# Patient Record
Sex: FEMALE | Race: WHITE
[De-identification: names, ages, dates, MRNs, and addresses within clinical notes are randomized per-mention and may not be internally consistent; named-entity substitution may affect disease eponyms.]

---

## 2017-04-27 ENCOUNTER — HOSPITAL ENCOUNTER (OUTPATIENT)
Dept: HOSPITAL 53 - M SFHCPLAZ | Age: 52
End: 2017-04-27
Attending: NURSE PRACTITIONER
Payer: MEDICARE

## 2017-04-27 DIAGNOSIS — Z00.00: Primary | ICD-10-CM

## 2017-04-27 DIAGNOSIS — G62.9: ICD-10-CM

## 2017-04-27 DIAGNOSIS — F32.9: ICD-10-CM

## 2017-04-27 DIAGNOSIS — E11.9: ICD-10-CM

## 2017-04-27 LAB
ALBUMIN SERPL BCG-MCNC: 3.9 GM/DL (ref 3.2–5.2)
ALBUMIN/GLOB SERPL: 1.11 {RATIO} (ref 1–1.93)
ALP SERPL-CCNC: 166 U/L (ref 45–117)
ALT SERPL W P-5'-P-CCNC: 206 U/L (ref 12–78)
ANION GAP SERPL CALC-SCNC: 7 MEQ/L (ref 8–16)
AST SERPL-CCNC: 233 U/L (ref 15–37)
BILIRUB SERPL-MCNC: 0.6 MG/DL (ref 0.2–1)
BUN SERPL-MCNC: 9 MG/DL (ref 7–18)
CALCIUM SERPL-MCNC: 9.1 MG/DL (ref 8.5–10.1)
CHLORIDE SERPL-SCNC: 103 MEQ/L (ref 98–107)
CHOLEST SERPL-MCNC: 185 MG/DL (ref ?–200)
CO2 SERPL-SCNC: 30 MEQ/L (ref 21–32)
CREAT SERPL-MCNC: 0.75 MG/DL (ref 0.55–1.02)
EST. AVERAGE GLUCOSE BLD GHB EST-MCNC: 120 MG/DL (ref 60–110)
GFR SERPL CREATININE-BSD FRML MDRD: > 60 ML/MIN/{1.73_M2} (ref 51–?)
GLUCOSE SERPL-MCNC: 111 MG/DL (ref 70–105)
POTASSIUM SERPL-SCNC: 3.9 MEQ/L (ref 3.5–5.1)
PROT SERPL-MCNC: 7.4 GM/DL (ref 6.4–8.2)
SODIUM SERPL-SCNC: 140 MEQ/L (ref 136–145)
T4 FREE SERPL-MCNC: 1.09 NG/DL (ref 0.76–1.46)
TRIGL SERPL-MCNC: 63 MG/DL (ref ?–150)
VIT B12 SERPL-MCNC: 686 PG/ML (ref 247–911)

## 2017-05-04 ENCOUNTER — HOSPITAL ENCOUNTER (OUTPATIENT)
Dept: HOSPITAL 53 - M SFHCPLAZ | Age: 52
End: 2017-05-04
Attending: NURSE PRACTITIONER
Payer: MEDICARE

## 2017-05-04 DIAGNOSIS — F34.1: ICD-10-CM

## 2017-05-04 DIAGNOSIS — R94.5: Primary | ICD-10-CM

## 2017-05-04 DIAGNOSIS — M54.9: ICD-10-CM

## 2017-05-04 DIAGNOSIS — Z79.899: ICD-10-CM

## 2017-05-04 LAB
ALBUMIN SERPL BCG-MCNC: 4 GM/DL (ref 3.2–5.2)
ALBUMIN/GLOB SERPL: 1.08 {RATIO} (ref 1–1.93)
ALP SERPL-CCNC: 145 U/L (ref 45–117)
ALT SERPL W P-5'-P-CCNC: 54 U/L (ref 12–78)
ANION GAP SERPL CALC-SCNC: 11 MEQ/L (ref 8–16)
AST SERPL-CCNC: 27 U/L (ref 15–37)
BASOPHILS # BLD AUTO: 0 K/MM3 (ref 0–0.2)
BASOPHILS NFR BLD AUTO: 0.7 % (ref 0–1)
BILIRUB SERPL-MCNC: 0.4 MG/DL (ref 0.2–1)
BUN SERPL-MCNC: 10 MG/DL (ref 7–18)
CALCIUM SERPL-MCNC: 8.8 MG/DL (ref 8.5–10.1)
CHLORIDE SERPL-SCNC: 103 MEQ/L (ref 98–107)
CO2 SERPL-SCNC: 28 MEQ/L (ref 21–32)
CREAT SERPL-MCNC: 0.81 MG/DL (ref 0.55–1.02)
EOSINOPHIL # BLD AUTO: 0.1 K/MM3 (ref 0–0.5)
EOSINOPHIL NFR BLD AUTO: 1.7 % (ref 0–3)
ERYTHROCYTE [DISTWIDTH] IN BLOOD BY AUTOMATED COUNT: 12.3 % (ref 11.5–14.5)
FERRITIN SERPL-MCNC: 57 NG/ML (ref 8–252)
GFR SERPL CREATININE-BSD FRML MDRD: > 60 ML/MIN/{1.73_M2} (ref 51–?)
GGT SERPL-CCNC: 157 U/L (ref 5–55)
GLUCOSE SERPL-MCNC: 98 MG/DL (ref 70–105)
INR PPP: 1.05
IRON SATN MFR SERPL: 27 % (ref 13.2–37.4)
LARGE UNSTAINED CELL #: 0.1 K/MM3 (ref 0–0.4)
LARGE UNSTAINED CELL %: 1.7 % (ref 0–4)
LYMPHOCYTES # BLD AUTO: 2.6 K/MM3 (ref 1.5–4.5)
LYMPHOCYTES NFR BLD AUTO: 36.7 % (ref 24–44)
MCH RBC QN AUTO: 29.1 PG (ref 27–33)
MCHC RBC AUTO-ENTMCNC: 33.4 G/DL (ref 32–36.5)
MCV RBC AUTO: 87.1 FL (ref 80–96)
MONOCYTES # BLD AUTO: 0.5 K/MM3 (ref 0–0.8)
MONOCYTES NFR BLD AUTO: 6.8 % (ref 0–5)
NEUTROPHILS # BLD AUTO: 3.5 K/MM3 (ref 1.8–7.7)
NEUTROPHILS NFR BLD AUTO: 52.3 % (ref 36–66)
PLATELET # BLD AUTO: 277 K/MM3 (ref 150–450)
POTASSIUM SERPL-SCNC: 4.2 MEQ/L (ref 3.5–5.1)
PROT SERPL-MCNC: 7.7 GM/DL (ref 6.4–8.2)
SODIUM SERPL-SCNC: 142 MEQ/L (ref 136–145)
TIBC SERPL-MCNC: 408 UG/DL (ref 250–450)
WBC # BLD AUTO: 6.8 K/MM3 (ref 4–10)

## 2017-05-04 PROCEDURE — 86708 HEPATITIS A ANTIBODY: CPT

## 2017-05-04 PROCEDURE — 86704 HEP B CORE ANTIBODY TOTAL: CPT

## 2017-05-04 PROCEDURE — 83550 IRON BINDING TEST: CPT

## 2017-05-04 PROCEDURE — 85610 PROTHROMBIN TIME: CPT

## 2017-05-04 PROCEDURE — 86705 HEP B CORE ANTIBODY IGM: CPT

## 2017-05-04 PROCEDURE — 86038 ANTINUCLEAR ANTIBODIES: CPT

## 2017-05-04 PROCEDURE — 80053 COMPREHEN METABOLIC PANEL: CPT

## 2017-05-04 PROCEDURE — 86803 HEPATITIS C AB TEST: CPT

## 2017-05-04 PROCEDURE — 86709 HEPATITIS A IGM ANTIBODY: CPT

## 2017-05-04 PROCEDURE — 82977 ASSAY OF GGT: CPT

## 2017-05-04 PROCEDURE — 87340 HEPATITIS B SURFACE AG IA: CPT

## 2017-05-04 PROCEDURE — 85025 COMPLETE CBC W/AUTO DIFF WBC: CPT

## 2017-05-04 PROCEDURE — 82728 ASSAY OF FERRITIN: CPT

## 2017-05-08 ENCOUNTER — HOSPITAL ENCOUNTER (OUTPATIENT)
Dept: HOSPITAL 53 - M RAD | Age: 52
End: 2017-05-08
Attending: FAMILY MEDICINE
Payer: MEDICARE

## 2017-05-08 DIAGNOSIS — R94.5: Primary | ICD-10-CM

## 2017-05-08 DIAGNOSIS — K76.0: ICD-10-CM

## 2017-05-08 NOTE — REP
Clinical:  Elevated liver function tests.

 

Technique:  Real time gray scale ultrasound examination using curved array

transducer.

 

Findings:

Mild fatty infiltration to the liver cannot be excluded.  No focal hepatic

lesions are identified.  The pancreas is incompletely evaluated due to interposed

bowel gas, but visualized portions appear normal.  The patient is status post

cholecystectomy.  No biliary ductal dilatation is appreciated and the common bile

duct measures 3.7 mm diameter.  Right kidney is normal in reniform shape without

hydronephrosis and measures 11.8 x 4.5 x 4.3 cm.  No ascites.

 

Impression:

Mild fatty infiltration to the liver without focal hepatic lesions identified.

 

 

Signed by

Srinivasa Tejada MD 05/08/2017 08:55 A

## 2017-06-27 ENCOUNTER — HOSPITAL ENCOUNTER (OUTPATIENT)
Dept: HOSPITAL 53 - M SFHCPLAZ | Age: 52
End: 2017-06-27
Attending: PHYSICIAN ASSISTANT
Payer: MEDICARE

## 2017-06-27 DIAGNOSIS — E11.9: ICD-10-CM

## 2017-06-27 DIAGNOSIS — Z01.818: Primary | ICD-10-CM

## 2017-06-27 DIAGNOSIS — N20.0: ICD-10-CM

## 2017-06-27 LAB
ANION GAP SERPL CALC-SCNC: 4 MEQ/L (ref 8–16)
BASOPHILS # BLD AUTO: 0 K/MM3 (ref 0–0.2)
BASOPHILS NFR BLD AUTO: 0.7 % (ref 0–1)
BUN SERPL-MCNC: 8 MG/DL (ref 7–18)
CALCIUM SERPL-MCNC: 9 MG/DL (ref 8.5–10.1)
CHLORIDE SERPL-SCNC: 104 MEQ/L (ref 98–107)
CO2 SERPL-SCNC: 31 MEQ/L (ref 21–32)
CREAT SERPL-MCNC: 0.65 MG/DL (ref 0.55–1.02)
EOSINOPHIL # BLD AUTO: 0.2 K/MM3 (ref 0–0.5)
EOSINOPHIL NFR BLD AUTO: 1.8 % (ref 0–3)
ERYTHROCYTE [DISTWIDTH] IN BLOOD BY AUTOMATED COUNT: 12.3 % (ref 11.5–14.5)
EST. AVERAGE GLUCOSE BLD GHB EST-MCNC: 111 MG/DL (ref 60–110)
GFR SERPL CREATININE-BSD FRML MDRD: > 60 ML/MIN/{1.73_M2} (ref 51–?)
GLUCOSE SERPL-MCNC: 97 MG/DL (ref 70–105)
LARGE UNSTAINED CELL #: 0.1 K/MM3 (ref 0–0.4)
LARGE UNSTAINED CELL %: 1.5 % (ref 0–4)
LYMPHOCYTES # BLD AUTO: 3.3 K/MM3 (ref 1.5–4.5)
LYMPHOCYTES NFR BLD AUTO: 38.2 % (ref 24–44)
MCH RBC QN AUTO: 29 PG (ref 27–33)
MCHC RBC AUTO-ENTMCNC: 33.9 G/DL (ref 32–36.5)
MCV RBC AUTO: 85.7 FL (ref 80–96)
MONOCYTES # BLD AUTO: 0.5 K/MM3 (ref 0–0.8)
MONOCYTES NFR BLD AUTO: 5.5 % (ref 0–5)
NEUTROPHILS # BLD AUTO: 4.3 K/MM3 (ref 1.8–7.7)
NEUTROPHILS NFR BLD AUTO: 52.2 % (ref 36–66)
PLATELET # BLD AUTO: 271 K/MM3 (ref 150–450)
POTASSIUM SERPL-SCNC: 4.5 MEQ/L (ref 3.5–5.1)
SODIUM SERPL-SCNC: 139 MEQ/L (ref 136–145)
WBC # BLD AUTO: 8.2 K/MM3 (ref 4–10)

## 2017-06-27 PROCEDURE — 87086 URINE CULTURE/COLONY COUNT: CPT

## 2017-06-27 PROCEDURE — 85025 COMPLETE CBC W/AUTO DIFF WBC: CPT

## 2017-06-27 PROCEDURE — 83036 HEMOGLOBIN GLYCOSYLATED A1C: CPT

## 2017-06-27 PROCEDURE — 80048 BASIC METABOLIC PNL TOTAL CA: CPT

## 2017-06-27 PROCEDURE — 81001 URINALYSIS AUTO W/SCOPE: CPT

## 2017-06-27 PROCEDURE — 93005 ELECTROCARDIOGRAM TRACING: CPT

## 2017-09-27 ENCOUNTER — HOSPITAL ENCOUNTER (OUTPATIENT)
Dept: HOSPITAL 53 - M SFHCPLAZ | Age: 52
End: 2017-09-27
Attending: NURSE PRACTITIONER
Payer: MEDICARE

## 2017-09-27 DIAGNOSIS — E11.9: Primary | ICD-10-CM

## 2017-09-27 LAB
ALBUMIN SERPL BCG-MCNC: 3.9 GM/DL (ref 3.2–5.2)
ALBUMIN/GLOB SERPL: 1.08 {RATIO} (ref 1–1.93)
ALP SERPL-CCNC: 109 U/L (ref 45–117)
ALT SERPL W P-5'-P-CCNC: 28 U/L (ref 12–78)
ANION GAP SERPL CALC-SCNC: 5 MEQ/L (ref 8–16)
AST SERPL-CCNC: 21 U/L (ref 15–37)
BILIRUB SERPL-MCNC: 0.5 MG/DL (ref 0.2–1)
BUN SERPL-MCNC: 9 MG/DL (ref 7–18)
CALCIUM SERPL-MCNC: 9.3 MG/DL (ref 8.5–10.1)
CHLORIDE SERPL-SCNC: 106 MEQ/L (ref 98–107)
CO2 SERPL-SCNC: 31 MEQ/L (ref 21–32)
CREAT SERPL-MCNC: 0.7 MG/DL (ref 0.55–1.02)
EST. AVERAGE GLUCOSE BLD GHB EST-MCNC: 117 MG/DL (ref 60–110)
GFR SERPL CREATININE-BSD FRML MDRD: > 60 ML/MIN/{1.73_M2} (ref 51–?)
GLUCOSE SERPL-MCNC: 96 MG/DL (ref 70–105)
POTASSIUM SERPL-SCNC: 4.6 MEQ/L (ref 3.5–5.1)
PROT SERPL-MCNC: 7.5 GM/DL (ref 6.4–8.2)
SODIUM SERPL-SCNC: 142 MEQ/L (ref 136–145)

## 2017-09-27 PROCEDURE — 80053 COMPREHEN METABOLIC PANEL: CPT

## 2017-09-27 PROCEDURE — 83036 HEMOGLOBIN GLYCOSYLATED A1C: CPT

## 2018-03-28 ENCOUNTER — HOSPITAL ENCOUNTER (OUTPATIENT)
Dept: HOSPITAL 53 - M SFHCPLAZ | Age: 53
End: 2018-03-28
Attending: NURSE PRACTITIONER
Payer: MEDICARE

## 2018-03-28 DIAGNOSIS — E11.9: Primary | ICD-10-CM

## 2018-03-28 DIAGNOSIS — Z53.8: ICD-10-CM

## 2018-07-20 ENCOUNTER — HOSPITAL ENCOUNTER (OUTPATIENT)
Dept: HOSPITAL 53 - M LABNEURO | Age: 53
End: 2018-07-20
Attending: PSYCHIATRY & NEUROLOGY
Payer: MEDICARE

## 2018-07-20 DIAGNOSIS — E11.9: Primary | ICD-10-CM

## 2018-07-20 DIAGNOSIS — R20.2: ICD-10-CM

## 2018-07-20 DIAGNOSIS — E07.9: ICD-10-CM

## 2018-07-20 LAB
ERYTHROCYTE SEDIMENTATION RATE: 15 MM/HR (ref 0–30)
EST. AVERAGE GLUCOSE BLD GHB EST-MCNC: 120 MG/DL (ref 60–110)
FOLATE SERPL-MCNC: 14.7 NG/ML
FREE T4: 1.03 NG/DL (ref 0.76–1.46)
RHEUMATOID FACTOR QUANT: 16.2 IU/ML (ref ?–15)
THYROID STIMULATING HORMONE: 3.14 UIU/ML (ref 0.36–3.74)
TOTAL PROTEIN: 7.3 GM/DL (ref 6.4–8.2)
VIT B12 SERPL-MCNC: 692 PG/ML

## 2018-07-20 PROCEDURE — 82746 ASSAY OF FOLIC ACID SERUM: CPT

## 2018-07-24 LAB
ALBUMIN %: 57.4 % (ref 55.8–66.1)
ALBUMIN: 4.19 GM/DL (ref 3.29–5.55)
ALPHA-1-GLOBULIN %: 3.8 % (ref 2.9–4.9)
ALPHA-1-GLOBULINS: 0.28 GM/DL (ref 0.17–0.41)
ALPHA-2-GLOBULINS %: 10 % (ref 7.1–11.8)
ALPHA-2-GLOBULINS: 0.73 GM/DL (ref 0.42–0.99)
B-GLOBULIN SERPL ELPH-MCNC: 0.47 GM/DL (ref 0.28–0.6)
BETA1 GLOB MFR SERPL ELPH: 6.5 % (ref 4.7–7.2)
BETA2 GLOB MFR SERPL ELPH: 6.1 % (ref 3.2–6.5)
BETA2 GLOB SERPL ELPH-MCNC: 0.45 GM/DL (ref 0.19–0.55)
GAMMA GLOBULIN %: 16.2 % (ref 11.1–18.8)
GAMMA GLOBULINS: 1.18 GM/DL (ref 0.65–1.58)
IT SERUM INTERPRETATION: (no result)
SPEP INTERPRETATION: (no result)

## 2018-07-25 LAB
GAMMA TOCOPHEROL SERPL-MCNC: 1.1 MG/L (ref 0.5–5.5)
VITAMIN B1 LEVEL WHOLE BLOOD: 116.4 NMOL/L (ref 66.5–200)
VITAMIN B6,PYRIDOXAL PHOSPHATE: 8.6 UG/L (ref 2–32.8)
VITAMIN E(ALPHA TOCOPHEROL): 8.8 MG/L (ref 7–25.1)

## 2019-02-05 ENCOUNTER — HOSPITAL ENCOUNTER (OUTPATIENT)
Dept: HOSPITAL 53 - M SFHCPLAZ | Age: 54
End: 2019-02-05
Attending: INTERNAL MEDICINE
Payer: MEDICARE

## 2019-02-05 DIAGNOSIS — R76.8: Primary | ICD-10-CM

## 2019-02-05 PROCEDURE — 86431 RHEUMATOID FACTOR QUANT: CPT

## 2019-02-05 PROCEDURE — 36415 COLL VENOUS BLD VENIPUNCTURE: CPT

## 2019-02-05 PROCEDURE — 86200 CCP ANTIBODY: CPT

## 2019-02-05 PROCEDURE — 85652 RBC SED RATE AUTOMATED: CPT

## 2019-03-15 ENCOUNTER — HOSPITAL ENCOUNTER (OUTPATIENT)
Dept: HOSPITAL 53 - M LAB REF | Age: 54
End: 2019-03-15
Attending: PHYSICIAN ASSISTANT
Payer: MEDICARE

## 2019-03-15 DIAGNOSIS — J11.1: Primary | ICD-10-CM

## 2019-03-15 LAB
FLUAV RNA UPPER RESP QL NAA+PROBE: POSITIVE
FLUBV RNA UPPER RESP QL NAA+PROBE: NEGATIVE

## 2019-04-08 ENCOUNTER — HOSPITAL ENCOUNTER (OUTPATIENT)
Dept: HOSPITAL 53 - M SMT | Age: 54
End: 2019-04-08
Attending: NURSE PRACTITIONER
Payer: MEDICARE

## 2019-04-08 DIAGNOSIS — R31.29: Primary | ICD-10-CM

## 2019-04-08 LAB
APPEARANCE UR: CLEAR
BACTERIA UR QL AUTO: (no result)
BILIRUB UR QL STRIP.AUTO: NEGATIVE
GLUCOSE UR QL STRIP.AUTO: NEGATIVE MG/DL
HGB UR QL STRIP.AUTO: NEGATIVE
KETONES UR QL STRIP.AUTO: (no result) MG/DL
LEUKOCYTE ESTERASE UR QL STRIP.AUTO: (no result)
MUCOUS THREADS URNS QL MICRO: (no result)
NITRITE UR QL STRIP.AUTO: NEGATIVE
PH UR STRIP.AUTO: 5 UNITS (ref 5–9)
PROT UR QL STRIP.AUTO: NEGATIVE MG/DL
RBC # UR AUTO: 1 /HPF (ref 0–3)
SP GR UR STRIP.AUTO: 1.02 (ref 1–1.03)
SQUAMOUS #/AREA URNS AUTO: 1 /HPF (ref 0–6)
UROBILINOGEN UR QL STRIP.AUTO: 0.2 MG/DL (ref 0–2)
WBC #/AREA URNS AUTO: 2 /HPF (ref 0–3)

## 2019-04-08 PROCEDURE — 81001 URINALYSIS AUTO W/SCOPE: CPT

## 2019-04-08 PROCEDURE — 88108 CYTOPATH CONCENTRATE TECH: CPT

## 2019-04-08 PROCEDURE — 87086 URINE CULTURE/COLONY COUNT: CPT

## 2019-04-17 ENCOUNTER — HOSPITAL ENCOUNTER (OUTPATIENT)
Dept: HOSPITAL 53 - M RAD | Age: 54
End: 2019-04-17
Attending: NURSE PRACTITIONER
Payer: MEDICARE

## 2019-04-17 DIAGNOSIS — Z90.710: ICD-10-CM

## 2019-04-17 DIAGNOSIS — N28.1: Primary | ICD-10-CM

## 2019-04-17 DIAGNOSIS — Z87.442: ICD-10-CM

## 2019-04-17 DIAGNOSIS — Z98.84: ICD-10-CM

## 2019-04-17 DIAGNOSIS — Z90.49: ICD-10-CM

## 2019-04-17 NOTE — REP
CT of the abdomen pelvis without IV or bowel contrast for left flank pain:

Comparison is 06/23/2014.

The previous left ureteral stent has been removed.

There is a 2 mm calcification at the lower pole of the left kidney,

nonobstructive.

There are no left ureteral calculi.  There is no left hydronephrosis or

perinephric stranding.

There are no right renal calculi or right hydronephrosis.  No right ureteral

calculi.

The visualized lung fields are unremarkable.

The unenhanced hepatic parenchyma, pancreas and spleen are unremarkable.

There is bariatric surgery, unchanged.

There is a cholecystectomy, unchanged.

The abdominal aorta is unremarkable.

The bowel and mesentery are otherwise unremarkable.

Pelvis:

There is an appendectomy.  There is a hysterectomy.  The vaginal cuff and adnexa

are unremarkable.  The bladder is unremarkable.

The pelvic bowel loops are unremarkable.

There is no ascites or adenopathy.

 

Impression:

There is a 2 ml nonobstructive left renal lower pole calculus.

There are is no left ureteral calculus.  There is no left hydronephrosis or

perinephric stranding.

The previous left ureteral stent has been removed.

There are no right renal or ureteral calculi.  No right hydronephrosis.

The the patient has bariatric surgery, cholecystectomy, appendectomy and

hysterectomy.

Otherwise, essentially negative CT of the abdomen and pelvis.

 

 

Electronically Signed by

Eric Mello MD 04/17/2019 04:25 P

## 2019-06-28 ENCOUNTER — HOSPITAL ENCOUNTER (OUTPATIENT)
Dept: HOSPITAL 53 - M WHC | Age: 54
End: 2019-06-28
Attending: NURSE PRACTITIONER
Payer: MEDICARE

## 2019-06-28 DIAGNOSIS — Z80.3: ICD-10-CM

## 2019-06-28 DIAGNOSIS — Z12.31: Primary | ICD-10-CM

## 2019-06-28 NOTE — REPMRS
Patient History

The patient states she has not had a clinical breast exam in over

a year.

Patient is postmenopausal.

Family history of breast cancer in paternal grandmother, breast 

cancer in paternal aunt, breast cancer at age 35 in niece.

No Hormone Replacement Therapy

 

Digital Woman Screen Mammo: June 28, 2019 - Exam #: 

[id number]

Bilateral CC and MLO view(s) were taken.

 

Technologist: Radha Del Real, Technologist

Prior study comparison: November 1, 2017, digital woman screen 

mammo performed at [facility].  May 26, 

2016, bilateral digital mammo screening bilat, performed at 

[facility].  September 25, 2013, digital woman 

screen mammo performed at [facility].

 

FINDINGS: There are scattered fibroglandular densities.  There 

has been no change in the appearance of the mammogram from the 

prior studies.  There is a mild amount of scattered 

fibroglandular density which is fairly symmetric. There is no 

interval development of dominant mass, architectural distortion, 

or grouped microcalcification suggestive of malignancy.

3-D tomosynthesis shows no additional findings.

 

Assessment: BI-RADS/ACR category 1 mammogram. Negative Mammogram.

 

Recommendation

Routine screening mammogram of both breasts in 1 year (for women 

over age 40).

This patient's Lifetime Breast Cancer Risk is estimated at 12.4 

%.

This mammogram was interpreted with the aid of an FDA-approved 

computer-aided dectection system.

 

Electronically Signed By: Guy Gallagher MD 06/28/19 [id number]

## 2019-09-16 ENCOUNTER — HOSPITAL ENCOUNTER (OUTPATIENT)
Dept: HOSPITAL 53 - M PAIN | Age: 54
End: 2019-09-16
Attending: ANESTHESIOLOGY
Payer: MEDICARE

## 2019-09-16 DIAGNOSIS — Z90.49: ICD-10-CM

## 2019-09-16 DIAGNOSIS — M79.7: ICD-10-CM

## 2019-09-16 DIAGNOSIS — Z87.891: ICD-10-CM

## 2019-09-16 DIAGNOSIS — M79.18: ICD-10-CM

## 2019-09-16 DIAGNOSIS — R60.0: ICD-10-CM

## 2019-09-16 DIAGNOSIS — Z98.84: ICD-10-CM

## 2019-09-16 DIAGNOSIS — F32.9: ICD-10-CM

## 2019-09-16 DIAGNOSIS — Z79.899: ICD-10-CM

## 2019-09-16 DIAGNOSIS — M54.5: ICD-10-CM

## 2019-09-16 DIAGNOSIS — M47.816: ICD-10-CM

## 2019-09-16 DIAGNOSIS — Z87.442: ICD-10-CM

## 2019-09-16 DIAGNOSIS — J45.909: ICD-10-CM

## 2019-09-16 DIAGNOSIS — G89.29: Primary | ICD-10-CM

## 2019-09-16 DIAGNOSIS — M47.812: ICD-10-CM

## 2019-09-16 DIAGNOSIS — M54.2: ICD-10-CM

## 2019-09-16 DIAGNOSIS — E66.01: ICD-10-CM

## 2019-09-16 DIAGNOSIS — K21.9: ICD-10-CM

## 2019-09-16 DIAGNOSIS — I10: ICD-10-CM

## 2019-09-16 DIAGNOSIS — K58.9: ICD-10-CM

## 2019-09-16 DIAGNOSIS — E11.9: ICD-10-CM

## 2019-09-16 DIAGNOSIS — H40.9: ICD-10-CM

## 2019-09-16 DIAGNOSIS — Z79.891: ICD-10-CM

## 2019-09-16 DIAGNOSIS — F41.9: ICD-10-CM

## 2019-09-16 DIAGNOSIS — G25.81: ICD-10-CM

## 2019-09-16 DIAGNOSIS — Z88.0: ICD-10-CM

## 2019-09-27 NOTE — ECWPNPC
PATIENT NAME: DELIA CUMMINGS

: 1965

GENDER: FEMALE

MRN: [id number]

VISIT DATE: 2019

DISCHARGE DATE: 19 1427

VISIT LOCKED DATE TIME: 

PHYSICIAN: DEA VALE MD

RESOURCE: DEA VALE MD

 

           

           

REASON FOR APPOINTMENT

           

          1. CERVICALGIA

           

HISTORY OF PRESENT ILLNESS

           

      PAIN SCREENING:

      PATIENT HAS A COMPLAINT OF ACUTE OR CHRONIC PAIN

           

           

          :YES

           

           54 YEAR OLD FEMALE PATIENT WITH A HISTORY OF CHRONIC NECK, LOW

          BACK, AND FEET PAIN. THE PATIENT DESCRIBES THE PAIN AS ACHING,

          BURNING, DAILY, AND CONTINUOUS WITH A PAIN SCORE OF 2-8/10

          DEPENDING ON PHYSICAL ACTIVITY. THE PATIENT STATES SHE HAS BEEN

          SUFFERING FROM THIS PAIN FOR MANY YEARS. THE PATIENT SAYS

          CURRENTLY HER MAIN CONCERN IS HER LOW BACK PAIN, WHICH IS

          AFFECTING HER ABILITY TO PERFORM HER DAILY ACTIVITIES, SUCH AS

          COOKING, CLEANING, AND MOVING AROUND. THE PATIENT STATES HER NECK

          PAIN IS MAINLY ON HER LEFT SIDE WITH RADIATION DOWN INTO HER LEFT

          SHOULDER. PATIENT DENIES UNEXPLAINABLE WEIGHT LOSS, FEVER,

          CHILLS, NEW CHANGES ON HER URINARY OR BOWEL CONTROL.

           

      FALL RISK SCREENING:

      SCREENING

           

           

          :NO FALLS REPORTED IN THE LAST YEAR

           

CURRENT MEDICATIONS

           

          TAKING LUMIGAN 0.01 % SOLUTION 1 DROP INTO AFFECTED EYE IN THE

          EVENING OPHTHALMIC BOTH EYES AT BEDTIME(SHORT)

          TAKING BRIMONIDINE TARTRATE 0.15 % SOLUTION 1 DROP INTO AFFECTED

          EYE OPHTHALMIC THREE TIMES A DAY

          TAKING ALBUTEROL SULFATE  (90 BASE) MCG/ACT AEROSOL

          SOLUTION 2 PUFFS AS NEEDED INHALATION EVERY 4 HRS

          TAKING MELATONIN 5 MG TABLET 1 TABLET AT BEDTIME AS NEEDED WITH

          FOOD ORALLY ONCE A DAY, NOTES: OTC

          TAKING CITALOPRAM HYDROBROMIDE 20 MG TABLET 1 TABLET ORALLY ONCE

          A DAY

          TAKING BIOTIN 10 MG TABLET 1 TABLET ORALLY ONCE A DAY

          TAKING MULTIVITAMIN ADULT - TABLET AS DIRECTED ORALLY

          TAKING VITAMIN D 1000 UNIT TABLET 1 TABLET ORALLY ONCE A DAY

          TAKING CANE - MISCELLANEOUS AS DIRECTED DX BACK, LEG PAIN

          TAKING CANE - MISCELLANEOUS AS DIRECTED _ DAILY. DX: M54.5

          TAKING NORCO 5-325 MG TABLET 1 TABLET AS NEEDED ORALLY Q 12 HOURS

          PRN PAIN

          NOT-TAKING BACTRIM -160 MG TABLET 1 TABLET ORALLY AS

          DIRECTED - 1 HOUR PRIOR TO CYSTOSCOPY

          DISCONTINUED CITALOPRAM HYDROBROMIDE 20 MG TABLET 1 TABLET ORALLY

          ONCE A DAY

          MEDICATION LIST REVIEWED AND RECONCILED WITH THE PATIENT

           

PAST MEDICAL HISTORY

           

          CHRONIC LEG PAIN/FEET PAIN

          MORBID OBESITY

          DM2-CONTROLLED S/P GASTRIC BYPASS

          GLAUCOMA - DR YANEZ AT THE [facility]

          IBS

          GERD

          CHRONIC LE EDEMA

          HYPERTENSION-2010 TTE WITH BORDERLINE CONCENTRIC LVH

          WITH PRESERVED SYSTOLIC DYSFUNCTION, MINIMAL DIASTOLIC

          DYSFUNCTION-FELTON - EKG  - NON-SPECIFIC T WAVE CHANGES

          DEPRESSION/ANXIETY

          ASTHMA - HARJINDER  - FEV1 80% PREDICTED

          ALLERGIC RHINITIS

          RLS

          DDD

          FIBROMYALGIA

          ARTHRITIS

          PRIOR NICOTINE ADDICTION-1 PPD X 20 YEARS, QUIT AT AGE 35

          DUB-ABD. PAIN, HYSTERECTOMY-BSO DR. LOVE 

          NL NOC OX .

          FOCAL LOW GRADE PAPILLARY PROLIFERATION OF BLADDER WITH CYSTITIS

          GLANDULARIS, , DR VELOZ, CYSTOS EVERY 3 M X2Y, THENEVERY

          6 M X 5Y

           L ADNEXAL MASS, FELT TO BE REMAINED OVARIAN TISSUE WITH

          CYST

           NEG NUC STRESS - CANNY

          KIDNEY STONES

          11/15 C SPINE XRAY MIL TO MOD DEG CHANEGS AT C5-6, LESSER EXTENT

          AT C4-5, MINIMAL DISC SPACE NARROWING

          11/15 L SPINE XRAY WITH MOD DEG CHANGES MOST PRONOUNCED AT L4-5,

          L5-S1

          CARPAL TUNNEL- RIGHT WRIST

          ARTHRITIS- NECK AND SHOULDER

          2018-FIT TEST-NEGATIVE

           

ALLERGIES

           

          PENICILLIN (FOR ALLERGIES USE ONLY): RASH - ALLERGY

           

SURGICAL HISTORY

           

          TUBAL LIGATION 

           X 3 83, 88, 89

          CHOLECYSTECTOMY, LAP, HERNIA REPAIR, UMBILICAL 

          REPEAT UMBILICAL HERNIA REPAIR - SOURAV 

          TONSILLECTOMY, ADNOIDECTOMY 1966

          D & C X 2 --- ABLATION WITH D&C , 

          TOOTH EXTRACTION

          SYL WITH BSO SECONDARY TO MENORRHAGIA ADENOMYOSIS/ ROSALBA-SCAR

          TISSUE 2011

          TURBT 

          GASTRIC BYPASS @ [facility] 14

          APPENDECTOMY @ [facility] 14

          CYSTOSCOPY, LEFT RETROGRADE PYELOGRAM, URETEROPYELOGRAM, JJ STENT

          PLACEMENT 14

          CYSTOSCOPY, LEFT RETROGRADE PYELOGRAM, PLUS LEFT URETEROSCOPY,

          BASKET EXTRACTION OF STONE, PLUS LASER STONE LITHOTRIPSY, PLUSE

          DOUBLE J STENT EXCHANGE 14

          GASTRIC BYPASS SURGERY @ [facility] 6/6/15

          APPENDECTOMY 6/6/15

          KIDNEY STONE 5/15

          MRI CERVICAL SPINE (NEUROLOGY): MILD CERVICAL SPONDYLOSIS AT

          C5-6, NO EXTRINSIC NERVE ROOT COMPRESSION 2018

          EMG: MILD LEFT ULNAR NEUROPATHY AT THE CUBITAL TUNNEL AND MILD

          RIGHT CARPAL TUNNEL SYNDROME 2018

          CYSTOSCOPY, DR. ISRAEL: NEGATIVE FOR HIGH GRADE UROTHELIAL CARCINOMA

          2017

          CYSTOSCOPY 2019

           

FAMILY HISTORY

           

          FATHER: ALIVE 88 YRS, HYPERLIPIDEMIA.

          MOTHER: ALIVE 82 YRS, CHRONIC LEG PAIN, PE, HTN, PREDIABETIC,

          A-FIB

          SIBLINGS: BROTHER  OF HYDROCEPHALY AT 21 MO. BROTHER 2

          PSYCHOLOGICAL, SISTERS PSYCHOLOGICAL, KIDNEY STONES

          PATERNAL GRAND MOTHER: 

          PATERNAL UNCLE: , BLOOD CLOTS IN THE LEGS

          PATERNAL AUNT: ALIVE, CANCER, BREAST

          3 BROTHER(S) , 5 SISTER(S) . 2 SON(S) , 1 DAUGHTER(S) - HEALTHY.

          NO FH CAD, CVA. , NO KNOWN FAMILY HISTORY OF ANY UROLOGICALLY

          RELATED DISEASES\\\\\\\/CANCERS.

           

SOCIAL HISTORY

           

          GENERAL:

           

          TOBACCO USE

          ARE YOU A:FORMER SMOKER

          HOW LONG HAS IT BEEN SINCE YOU LAST SMOKED?> 10 YEARS

           

           

          HIV / HEP-C SCREENING

          HIV TEST OFFERED TO PATIENT:YES

          DATE OFFERED:2017

          TEST ACCEPTED:NO

          HEP-C TEST OFFERED TO PATIENT:NO

          REASON:PATIENT DECLINED

           

           

          OTHERS AT HOME: BOYFRIEND.

           

           

          EDUCATION

          LEVEL OF EDUCATION:FINISHED HIGH SCHOOL

           

           

          DIET: NO ADDED SALT.

           

           

          LANGUAGE  ENGLISH.

           

           

          DOMESTIC VIOLENCE

          DO YOU FEEL SAFE IN YOUR ENVIRONMENT?YES

           

           

          RECREATIONAL DRUG USE  DENIES.

           

           

          EXERCISE: WALKING.

           

           

          LEARNING BARRIERS / SPECIAL NEEDS

          CHANGE FROM LAST VISIT?NO

          BARRIERS TO LEARNING?NO

          HEARING IMPAIRED?NO

          VISION IMPAIRED?YES

          COGNITIVELY IMPAIRED?NO

          :CORRECTIVE LENSES

          READINESS TO LEARN?YES

          LEARNING PREFERENCES?NO

          LEARNING CAPABILITIES PRESENT?YES

          EMOTIONAL BARRIERS?NO

          SPECIAL DEVICES?NO

           NEEDED?NO

           

           

          PAIN CLINIC PFS, CLERGY, PUBLIC HEALTH REFERRALS

          HAS THE PATIENT BEEN EDUCATED REGARDING HIS/HER PLAN OF CARE?YES

          HAS THE PATIENT BEEN EDUCATED REGARDING PAIN, THE RISK FOR PAIN,

          THE IMPORTANCE OF EFFECTIVE PAIN MANAGEMENT, AND THE PAIN

          ASSESSMENT PROCESS?YES

           

           

          LATEX QUESTIONNAIRE

          LATEX ALLERGY : HAVE YOU EVER DEVELOPED ANY TYPE OF REACTION

          AFTER HANDLING LATEX PRODUCTS SUCH AS RUBBER GLOVES, CONDOMS,

          DIAPHRAGMS, BALLOONS, SOCKS, OR UNDERWEAR?NO

          LATEX ALLERGY : HAVE YOU EVER DEVELOPED ANY TYPE OF REACTION

          DURING OR AFTER DENTAL APPOINTMENT, VAGINAL/RECTAL EXAMINATION,

          SURGICAL PROCEDURE, OR ANY OTHER EXPOSURE?NO

          DATE ASKED : 2019

          LATEX RISK : HAVE YOU EVER HAD ANY DIFFICULTY BREATHING OR HIVES

          AFTER EATING OR HANDLING ANY FRUITS, OR VEGETABLES; SUCH AS KIWI,

          BANANAS, STONE FRUITS, OR CHESTNUTSNO

          LATEX RISK : DO YOU HAVE A PREVIOUS PERSONAL HISTORY OF MORE THAN

          NINE SURGERIES, SPINA BIFIDA, OR REPEATED CATHERIZATIONS? YES

          - PLEASE INDICATE : > 9 SURGERIES

          LATEX RISK : ARE YOU FREQUENTLY EXPOSED TO LATEX PRODUCTS IN YOUR

          OCCUPATION?NO

           

           

          CAFFEINE

          CAFFEINE USE?YES 1 DAY

           

           

          ADVANCE DIRECTIVE

          ADVANCE DIRECTIVE DISCUSSED WITH PATIENT:YES DECLINED

           

           

          Presybeterian  NO Yazdanism BELIEFS THAT WOULD IMPACT HEALTH CARE.

           

           

          MARITAL STATUS: ..

           

           

          ALCOHOL SCREENING

          DID YOU HAVE A DRINK CONTAINING ALCOHOL IN THE PAST YEAR?NO

          POINTS0

          INTERPRETATIONNEGATIVE

           

           

          OCCUPATION: DISABED.

           

           

          SEXUAL HX  HAD SEX IN THE LAST 12 MONTHS (VAGINAL, ORAL, OR

          ANAL)?: YES, WITH: MEN ONLY, USE PROTECTION?: NO, PREVENTION

          STRATEGIES DISCUSSED:: OTHER, HAVE YOU EVER HAD AN STD?: YES,

          CHLAMYDIA?: YES.

           

HOSPITALIZATION/MAJOR DIAGNOSTIC PROCEDURE

           

          C SECTION 83, 88,89

          D & C 88

          SYL WITH BSO 

          GASTRIC BYPASS 

          S/P GASTRIC BYPASS - CONCERN FOR BLOOD CLOTS, WORK UP NEGATIVE

          

           

REVIEW OF SYSTEMS

           

      REVIEWED BY:

           

          PROVIDER:    DEA VALE MD .

           

      CONSTITUTIONAL:

           

          ANY CHANGE IN YOUR MEDICAL CONDITION?    NO . CHILLS    NO .

          FEVER    NO .

           

      INFECTION:

           

          DO YOU HAVE NEW INFECTIONS?    YES, CHRONIC EAR INFECTIONS . DO

          YOU HAVE HISTORY OF MRSA?    NO .

           

      MUSCULOSKELETAL:

           

          ANY NEW PATTERNS OF PAIN OR NUMBNESS?    NO . SYTEMIC LUPUS    NO

          .

           

      GASTROENTEROLOGY:

           

          ANY NEW CHANGE IN BOWEL CONTROL?    NO . BARRETTS ESOPHAGUS    NO

          . CIRRHOSIS    NO . HEPATITIS    NO . LIVER FAILURE    NO . ACID

          REFLUX    YES . UNEXPLAINED WEIGHT LOSS    NO .

           

      GENITOURINARY:

           

          ANY NEW CHANGE IN BLADDER CONTROL?    YES, STRESS INCONTINENCE .

          IS THERE A CHANCE YOU COULD BE PREGNANT?    NO .

           

      HEMATOLOGY/LYMPH:

           

          DO YOU TAKE ANY BLOOD THINNERS? (FOR EXAMPLE- COUMADIN, PLAVIX,

          AGGRENOX, PLATEL, PRADAXA, OR XARELTO)    NO . WHEN WAS YOUR LAST

          DOSE?    DATE: TIME:  . LOW PLATELET COUNT    NO . SICKLE CELL

          DISEASE    NO . VON WILLIEBRANDS    NO . FACTOR V LEIDEN    NO .

          THALLASEMIA    NO . ANEMIA    NO . EASY BRUISING    NO .

           

      NEUROLOGY:

           

          HAVE YOU FALLEN IN THE PAST 12 MONTHS?    YES, FELL A FEW MONTHS

          AGO, NOT SURE WHY PT DENIES INJURIES . ANY NEW EXTREMITY NUMBNESS

          OR WEAKNESS?    NO . HEAD INJURY    NO . DEMENTIA    NO .

          CEREBRAL PALSY    NO . MULTIPLE SCLEROSIS    NO . DIZZINESS    NO

          . HEADACHE    NO . STROKES    NO . VERTIGO    NO .

           

      CARDIOLOGY:

           

          DO YOU HAVE A PACEMAKER OR DEFIBRILLATOR?    NO . ANGINA    NO .

          HEART ATTACK    NO . HEART SURGERY    NO . CONGESTIVE HEART

          FAILURE/FLUID OVERLOAD    NO . CHEST PAIN    NO . HIGH BLOOD

          PRESSURE    NO . IRREGULAR HEART BEAT    NO .

           

      RESPIRATORY:

           

          HAVE YOU BEEN SICK IN THE PAST WEEK?    NO . FEVER    NO . FLU

          LIKE SYMPTOMS?    NO . CPAP    NO . BYPAP    NO . ASTHMA    YES .

          EMPHYSEMA    NO . CHRONIC LUNG DISEASES    NO . SHORTNESS OF

          BREATH ON EXERTION    NO . COUGH    NO . SNORING    NO .

           

      INTEGUMENTARY:

           

          DO YOU HAVE ANY RASHES OR OPEN SORES?    YES, BUG BITES .

           

      ALLERGIC/IMMUNO:

           

          ARE YOU ALLERGIC TO IV DYE?    NO . ANY NEW ALLERGIES?    NO .

           

      PSYCHIATRIC:

           

          DO YOU HAVE THOUGHTS OF HURTING YOURSELF OR SOMEONE ELSE?    NO .

          ARE YOU ABUSED, NEGLECTED, OR IN AN UNSAFE ENVIRONMENT?    NO .

           

      ENDOCRINOLOGY:

           

          ARE YOU DIABETIC?    NO . THYROID DISORDER    NO .

           

      OTHER:

           

          DO YOU NEED ANY PRESCRIPTIONS?    YES, HYDROCODONE, THEY CUT MINE

          DOWN AND NOW IT'S NOT ENOUGH . IF YES, PLEASE LIST:    ____ . ANY

          NEW PROBLEMS WITH YOUR MEDICATIONS?    NO . WHEN DID YOU LAST

          EAT?    ____ . WHEN DID YOU LAST DRINK?    ____ . WHAT DID YOU

          LAST DRINK?    ____ . NAME OF PERSON DRIVING YOU HOME?    ____ .

          DO YOU HAVE ANY OTHER QUESTIONS OR CONCERNS    YES, JUST WANT

          HELP WITH PAIN .

           

VITAL SIGNS

           

          .4 LBS, HT 58 IN, BMI 46.06 INDEX, /78 MM HG, HR 73

          /MIN, RR 18 /MIN, TEMP 97.6 F, OXYGEN SAT % 96%, NA INITIALS AW

          1315, REVIEWED BY: EM.

           

EXAMINATION

           

      GENERAL EXAMINATION: PATIENT IS ALERT O X 3 AND

          COOPERATIVE. LUNGS CLEAR, TO AUSCULTATION. HEART: NO MURMURS OR

          GALLOPS; FACIAL CRANIAL NERVES ARE GROSSLY NORMAL. GOOD SYMMETRY

          OF FACIAL MUSCLE MOVEMENT. NORMAL VISUAL RUSS. PATIENT USES A

          CANE TO AMBULATE. TENDERNESS IN THE LOW BACK NEAR THE PARASPINAL

          MUSCLE GROUP. PAIN INCREASES OVER THE LUMBAR FACET JOINTS WITH

          EXTENSION AND LATERAL ROTATION OF THE LOW BACK. PRESENCE OF BANDS

          OF TISSUE AND TRIGGER POINTS WITH RESTRICTION OF MOVEMENT OF THE

          NECK, ESPECIALLY ON THE LEFT SIDE. MRI OF THE CERVICAL SPINE DONE

          ON 2018 SHOWS CERVICAL FACET ARTHROPATHY CHANGES. MRI OF

          THE LUMBAR SPINE DONE ON 2018 SHOWS LUMBAR FACET

          ARTHROPATHY CHANGES, ESPECIALLY AT L5-S1 LEVEL.

           

ASSESSMENTS

           

          CERVICALGIA - M54.2 (PRIMARY)

           

          MYALGIA, OTHER SITE - M79.18

           

          LOW BACK PAIN - M54.5

           

          OTHER CHRONIC PAIN - G89.29

           

          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

          RADICULOPATHY - M47.812

           

          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

          - M47.816

           

TREATMENT

           

      CERVICALGIA

          CLINICAL NOTES: WE DISCUSSED SEVERAL ISSUES WITH MS. CUMMINGS'S PAIN

          MANAGEMENT CASE. I DISCUSSED WITH THE PATIENT ABOUT THE OPTIONS

          OF NECK OR LOW BACK TRIGGER POINT INJECTIONS OR DIAGNOSTIC FACET

          BLOCKS TO CONSIDER RADIOFREQUENCY ABLATION DUE TO THE LOW BACK

          PAIN BEING MAINLY AXIAL AND THE LUMBAR SPINE MRI SHOWS ARTHRITIS

          IN THE FACET JOINTS, HOWEVER THE PATIENT SAYS SHE WOULD LIKE TO

          TRY MANAGING HER PAIN WITH MEDICATION FIRST. THEREFORE, I WOULD

          LIKE TO REFER THE PATIENT TO CONSIDER MEDICATION MANAGEMENT FOR

          CHRONIC PAIN. THE PATIENT WAS ADVISED TO CALL IF SHE WOULD LIKE

          TO BE SEEN FOR INJECTION THERAPY IN THE FUTURE. INSTRUCTIONS WERE

          GIVEN, QUESTIONS WERE ANSWERED, PATIENT REPORTS UNDERSTANDING AND

          AGREES WITH THE PLAN. I, JOE LACEY, DOCUMENTED THE ABOVE

          INFORMATION ACTING AS A SCRIBE FOR DR. VALE. I HAVE REVIEWED

          THE ABOVE DOCUMENT, WRITTEN BY JOE MANDEL AND I VERIFY

          THAT IT IS ACCURATE.

           

          DEAR ADIS TYLER, [facility]:

           

          THANK YOU FOR YOUR KIND REFERRAL OF DELIA CUMMINGS. IF YOU WANT TO

          DISCUSS HER CASE WITH ME PLEASE CALL ME AT THE PAIN CENTER AT

          [phone].

           

          SINCERELY,

           

          DEA VALE MD

           

          PAIN MEDICINE

           

          .

           

PROCEDURE CODES

           

           ESTABILISHED PATIENT [facility] FACILITY CHARGE

           

           CURRENT MEDS W/DOSAGES DOCUMENTED

           

           PAIN ASSESS POS TOOL F/U PLAN DOC

           

DISPOSITION & COMMUNICATION

           

FOLLOW UP

           

          REASON: BEING REFERRED TO PALLIATIVE CARE

           

 

ELECTRONICALLY SIGNED BY DEA VALE MD, MD ON

          2019 AT 05:57 PM EDT

           

           

           

 

DISCLAIMER :

THIS IS A VISIT SUMMARY EXTRACTED FROM THE [employer] CHART.

IT IS NOT A COPY OF THE [employer] PROGRESS NOTE.

MTDD

## 2019-12-27 ENCOUNTER — HOSPITAL ENCOUNTER (OUTPATIENT)
Dept: HOSPITAL 53 - M SFHCPLAZ | Age: 54
End: 2019-12-27
Attending: NURSE PRACTITIONER
Payer: MEDICARE

## 2019-12-27 DIAGNOSIS — E55.9: ICD-10-CM

## 2019-12-27 DIAGNOSIS — E11.9: Primary | ICD-10-CM

## 2019-12-27 LAB
25(OH)D3 SERPL-MCNC: 32.1 NG/ML (ref 30–100)
ALBUMIN SERPL BCG-MCNC: 3.9 GM/DL (ref 3.2–5.2)
ALT SERPL W P-5'-P-CCNC: 24 U/L (ref 12–78)
BILIRUB SERPL-MCNC: 0.4 MG/DL (ref 0.2–1)
BUN SERPL-MCNC: 12 MG/DL (ref 7–18)
CALCIUM SERPL-MCNC: 9.5 MG/DL (ref 8.5–10.1)
CHLORIDE SERPL-SCNC: 108 MEQ/L (ref 98–107)
CO2 SERPL-SCNC: 28 MEQ/L (ref 21–32)
CREAT SERPL-MCNC: 0.82 MG/DL (ref 0.55–1.3)
CREAT UR-MCNC: 230 MG/DL
EST. AVERAGE GLUCOSE BLD GHB EST-MCNC: 137 MG/DL (ref 60–110)
GFR SERPL CREATININE-BSD FRML MDRD: > 60 ML/MIN/{1.73_M2} (ref 51–?)
GLUCOSE SERPL-MCNC: 105 MG/DL (ref 70–100)
MICROALBUMIN UR-MCNC: 12.7 MG/L
MICROALBUMIN/CREAT UR: 5.5 MCG/MG (ref 0–30)
POTASSIUM SERPL-SCNC: 4.4 MEQ/L (ref 3.5–5.1)
PROT SERPL-MCNC: 7.7 GM/DL (ref 6.4–8.2)
SODIUM SERPL-SCNC: 141 MEQ/L (ref 136–145)

## 2019-12-27 PROCEDURE — 83036 HEMOGLOBIN GLYCOSYLATED A1C: CPT

## 2019-12-27 PROCEDURE — 80053 COMPREHEN METABOLIC PANEL: CPT

## 2019-12-27 PROCEDURE — 82043 UR ALBUMIN QUANTITATIVE: CPT

## 2019-12-27 PROCEDURE — 82306 VITAMIN D 25 HYDROXY: CPT

## 2019-12-27 PROCEDURE — 36415 COLL VENOUS BLD VENIPUNCTURE: CPT

## 2020-01-22 ENCOUNTER — HOSPITAL ENCOUNTER (OUTPATIENT)
Dept: HOSPITAL 53 - M LAB REF | Age: 55
End: 2020-01-22
Attending: PHYSICIAN ASSISTANT
Payer: MEDICARE

## 2020-01-22 DIAGNOSIS — J10.1: Primary | ICD-10-CM

## 2020-01-22 LAB
FLUAV RNA UPPER RESP QL NAA+PROBE: NEGATIVE
FLUBV RNA UPPER RESP QL NAA+PROBE: NEGATIVE

## 2020-12-08 ENCOUNTER — HOSPITAL ENCOUNTER (OUTPATIENT)
Dept: HOSPITAL 53 - M SFHCPLAZ | Age: 55
End: 2020-12-08
Attending: NURSE PRACTITIONER
Payer: MEDICARE

## 2020-12-08 DIAGNOSIS — Z13.220: ICD-10-CM

## 2020-12-08 DIAGNOSIS — Z13.29: ICD-10-CM

## 2020-12-08 DIAGNOSIS — Z98.84: ICD-10-CM

## 2020-12-08 DIAGNOSIS — E11.9: Primary | ICD-10-CM

## 2020-12-08 LAB
ALBUMIN SERPL BCG-MCNC: 3.9 GM/DL (ref 3.2–5.2)
ALT SERPL W P-5'-P-CCNC: 33 U/L (ref 12–78)
BILIRUB SERPL-MCNC: 0.2 MG/DL (ref 0.2–1)
BUN SERPL-MCNC: 14 MG/DL (ref 7–18)
CALCIUM SERPL-MCNC: 9.7 MG/DL (ref 8.5–10.1)
CHLORIDE SERPL-SCNC: 106 MEQ/L (ref 98–107)
CHOLEST SERPL-MCNC: 205 MG/DL (ref ?–200)
CHOLEST/HDLC SERPL: 3.8 {RATIO} (ref ?–5)
CO2 SERPL-SCNC: 31 MEQ/L (ref 21–32)
CREAT SERPL-MCNC: 0.89 MG/DL (ref 0.55–1.3)
CREAT UR-MCNC: 285 MG/DL
EST. AVERAGE GLUCOSE BLD GHB EST-MCNC: 123 MG/DL (ref 60–110)
FERRITIN SERPL-MCNC: 29 NG/ML (ref 8–252)
GFR SERPL CREATININE-BSD FRML MDRD: > 60 ML/MIN/{1.73_M2} (ref 51–?)
GLUCOSE SERPL-MCNC: 103 MG/DL (ref 70–100)
HCT VFR BLD AUTO: 44.7 % (ref 36–47)
HDLC SERPL-MCNC: 54 MG/DL (ref 40–?)
HGB BLD-MCNC: 14.1 G/DL (ref 12–15.5)
IRON SATN MFR SERPL: 20.9 % (ref 13.2–45)
IRON SERPL-MCNC: 82 UG/DL (ref 50–170)
LDLC SERPL CALC-MCNC: 132 MG/DL (ref ?–100)
MCH RBC QN AUTO: 27.4 PG (ref 27–33)
MCHC RBC AUTO-ENTMCNC: 31.5 G/DL (ref 32–36.5)
MCV RBC AUTO: 86.8 FL (ref 80–96)
MICROALBUMIN UR-MCNC: 25.7 MG/L
MICROALBUMIN/CREAT UR: 9 MCG/MG (ref 0–30)
NONHDLC SERPL-MCNC: 151 MG/DL
PLATELET # BLD AUTO: 320 10^3/UL (ref 150–450)
POTASSIUM SERPL-SCNC: 5.2 MEQ/L (ref 3.5–5.1)
PROT SERPL-MCNC: 8 GM/DL (ref 6.4–8.2)
RBC # BLD AUTO: 5.15 10^6/UL (ref 4–5.4)
SODIUM SERPL-SCNC: 139 MEQ/L (ref 136–145)
TIBC SERPL-MCNC: 393 UG/DL (ref 250–450)
TRIGL SERPL-MCNC: 93 MG/DL (ref ?–150)
TSH SERPL DL<=0.005 MIU/L-ACNC: 2.88 UIU/ML (ref 0.36–3.74)
WBC # BLD AUTO: 9.1 10^3/UL (ref 4–10)

## 2020-12-08 PROCEDURE — 83550 IRON BINDING TEST: CPT

## 2020-12-08 PROCEDURE — 84443 ASSAY THYROID STIM HORMONE: CPT

## 2020-12-08 PROCEDURE — 80053 COMPREHEN METABOLIC PANEL: CPT

## 2020-12-08 PROCEDURE — 82728 ASSAY OF FERRITIN: CPT

## 2020-12-08 PROCEDURE — 36415 COLL VENOUS BLD VENIPUNCTURE: CPT

## 2020-12-08 PROCEDURE — 82043 UR ALBUMIN QUANTITATIVE: CPT

## 2020-12-08 PROCEDURE — 80061 LIPID PANEL: CPT

## 2020-12-08 PROCEDURE — 83036 HEMOGLOBIN GLYCOSYLATED A1C: CPT

## 2020-12-08 PROCEDURE — 85027 COMPLETE CBC AUTOMATED: CPT

## 2020-12-14 ENCOUNTER — HOSPITAL ENCOUNTER (OUTPATIENT)
Dept: HOSPITAL 53 - M WHC | Age: 55
End: 2020-12-14
Attending: NURSE PRACTITIONER
Payer: MEDICARE

## 2020-12-14 DIAGNOSIS — Z80.3: ICD-10-CM

## 2020-12-14 DIAGNOSIS — Z12.31: Primary | ICD-10-CM

## 2020-12-14 DIAGNOSIS — Z78.0: ICD-10-CM

## 2020-12-14 DIAGNOSIS — Z85.89: ICD-10-CM

## 2020-12-14 NOTE — REPMRS
Patient History

The patient states she had a clinical breast exam in December 2020.

Patient is postmenopausal and has history of other cancer at age 

46.

Family history of breast cancer in paternal grandmother, breast 

cancer in paternal aunt, breast cancer at age 35 in niece, breast

cancer at age 82 in mother.

No Hormone Replacement Therapy

 

Digital Woman Screen Mammo: December 14, 2020 - Exam #: 

[id number]

Bilateral CC and MLO view(s) were taken.

 

Technologist: Milvia Blanton Technologist

Prior study comparison: June 28, 2019, bilateral digital woman 

screen mammo performed at [facility] 

[facility].  November 1, 2017, digital woman screen mammo 

performed at [facility].  

May 26, 2016, bilateral digital mammo screening bilat, performed 

at [facility].

 

FINDINGS: There are scattered fibroglandular densities.  The 

Volpara volumetric breast density category is:B.  There has been 

no change in the appearance of the mammogram from the prior 

studies.  There is a mild amount of scattered fibroglandular 

density which is fairly symmetric. There is no interval 

development of dominant mass, architectural distortion, or 

grouped microcalcification suggestive of malignancy.

3-D tomosynthesis shows no additional findings.

 

Assessment: BI-RADS/ACR category 1 mammogram. Negative Mammogram.

 

Recommendation

Routine screening mammogram of both breasts in 1 year (for women 

over age 40).

This patient's  [facility] Lifetime Breast Cancer Risk is 

estimated at 16.4 %.

This mammogram was interpreted with the aid of an FDA-approved 

computer-aided dectection system.

 

Electronically Signed By: Guy Gallagher MD 12/14/20 [id number]

## 2021-04-12 ENCOUNTER — HOSPITAL ENCOUNTER (OUTPATIENT)
Dept: HOSPITAL 53 - M WUC | Age: 56
End: 2021-04-12
Attending: PHYSICIAN ASSISTANT
Payer: MEDICARE
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
## 2021-04-12 DIAGNOSIS — S62.346A: Primary | ICD-10-CM
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
## 2021-04-12 NOTE — REP
INDICATION:

PAIN.



COMPARISON:

None



TECHNIQUE:

Four views



FINDINGS:

There is a fracture involving the base of the 5th metacarpal with slight medial

displacement and angulation.



IMPRESSION:

Fracture as described above.





<Electronically signed by Ashok Sawyer > 04/12/21 [id number]

## 2021-06-07 ENCOUNTER — HOSPITAL ENCOUNTER (OUTPATIENT)
Dept: HOSPITAL 53 - M RAD | Age: 56
End: 2021-06-07
Attending: PHYSICIAN ASSISTANT
Payer: MEDICARE

## 2021-06-07 ENCOUNTER — HOSPITAL ENCOUNTER (EMERGENCY)
Dept: HOSPITAL 53 - M ED | Age: 56
Discharge: HOME | End: 2021-06-07
Payer: MEDICARE

## 2021-06-07 VITALS — SYSTOLIC BLOOD PRESSURE: 167 MMHG | DIASTOLIC BLOOD PRESSURE: 86 MMHG

## 2021-06-07 VITALS — WEIGHT: 214.95 LBS | HEIGHT: 58 IN | BODY MASS INDEX: 45.12 KG/M2

## 2021-06-07 DIAGNOSIS — S62.316D: Primary | ICD-10-CM

## 2021-06-07 DIAGNOSIS — F41.9: ICD-10-CM

## 2021-06-07 DIAGNOSIS — E11.9: ICD-10-CM

## 2021-06-07 DIAGNOSIS — I10: ICD-10-CM

## 2021-06-07 DIAGNOSIS — M54.9: ICD-10-CM

## 2021-06-07 DIAGNOSIS — K58.8: ICD-10-CM
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
## 2021-06-07 DIAGNOSIS — F07.81: ICD-10-CM

## 2021-06-07 DIAGNOSIS — J45.909: ICD-10-CM

## 2021-06-07 DIAGNOSIS — R10.32: ICD-10-CM

## 2021-06-07 DIAGNOSIS — F32.9: ICD-10-CM
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
## 2021-06-07 DIAGNOSIS — S62.316D: ICD-10-CM

## 2021-06-07 DIAGNOSIS — K59.00: ICD-10-CM

## 2021-06-07 DIAGNOSIS — Z88.0: ICD-10-CM

## 2021-06-07 DIAGNOSIS — S09.90XA: Primary | ICD-10-CM

## 2021-06-07 DIAGNOSIS — Z79.899: ICD-10-CM
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
## 2021-06-07 DIAGNOSIS — M47.812: ICD-10-CM

## 2021-06-07 DIAGNOSIS — K21.9: ICD-10-CM

## 2021-06-07 LAB
ALBUMIN SERPL BCG-MCNC: 3.9 GM/DL (ref 3.2–5.2)
ALT SERPL W P-5'-P-CCNC: 32 U/L (ref 12–78)
BASOPHILS # BLD AUTO: 0.1 10^3/UL (ref 0–0.2)
BASOPHILS NFR BLD AUTO: 0.6 % (ref 0–1)
BILIRUB CONJ SERPL-MCNC: 0.2 MG/DL (ref 0–0.2)
BILIRUB SERPL-MCNC: 0.5 MG/DL (ref 0.2–1)
BUN SERPL-MCNC: 10 MG/DL (ref 7–18)
CALCIUM SERPL-MCNC: 9.6 MG/DL (ref 8.5–10.1)
CHLORIDE SERPL-SCNC: 105 MEQ/L (ref 98–107)
CO2 SERPL-SCNC: 28 MEQ/L (ref 21–32)
CREAT SERPL-MCNC: 0.8 MG/DL (ref 0.55–1.3)
EOSINOPHIL # BLD AUTO: 0.2 10^3/UL (ref 0–0.5)
EOSINOPHIL NFR BLD AUTO: 1.8 % (ref 0–3)
GFR SERPL CREATININE-BSD FRML MDRD: > 60 ML/MIN/{1.73_M2} (ref 51–?)
GLUCOSE SERPL-MCNC: 107 MG/DL (ref 70–100)
HCT VFR BLD AUTO: 45 % (ref 36–47)
HGB BLD-MCNC: 14.7 G/DL (ref 12–15.5)
LYMPHOCYTES # BLD AUTO: 2.9 10^3/UL (ref 1.5–5)
LYMPHOCYTES NFR BLD AUTO: 30.2 % (ref 24–44)
MCH RBC QN AUTO: 28.3 PG (ref 27–33)
MCHC RBC AUTO-ENTMCNC: 32.7 G/DL (ref 32–36.5)
MCV RBC AUTO: 86.7 FL (ref 80–96)
MONOCYTES # BLD AUTO: 0.8 10^3/UL (ref 0–0.8)
MONOCYTES NFR BLD AUTO: 8.5 % (ref 2–8)
NEUTROPHILS # BLD AUTO: 5.7 10^3/UL (ref 1.5–8.5)
NEUTROPHILS NFR BLD AUTO: 58.5 % (ref 36–66)
PLATELET # BLD AUTO: 316 10^3/UL (ref 150–450)
POTASSIUM SERPL-SCNC: 3.8 MEQ/L (ref 3.5–5.1)
PROT SERPL-MCNC: 8.2 GM/DL (ref 6.4–8.2)
RBC # BLD AUTO: 5.19 10^6/UL (ref 4–5.4)
SODIUM SERPL-SCNC: 141 MEQ/L (ref 136–145)
WBC # BLD AUTO: 9.7 10^3/UL (ref 4–10)

## 2021-06-07 NOTE — REP
INDICATION:

LLQ PAIN, EVAL FOR DIVERTICULITIS.



COMPARISON:

04/17/2019 the latest prior also without contrast



TECHNIQUE:

Standard helical technique without intravenous contrast or oral bowel preparatory

contrast administration



FINDINGS:

The lung bases are clear and unchanged.



The liver, spleen, pancreas, adrenal glands, and kidneys are within normal limits.

The small calculus seen previously in the inferior pole the left kidney is no longer

present.  There is no nephroureterolithiasis, hydronephrosis, or hydroureter.  There

are no urinary bladder calcifications.  Limited evaluation of the bowel loops and the

mesenteries show no gross abnormalities.  There is no free fluid or free air.  Limited

evaluation of the abdominal aorta and para-aortic regions show no gross abnormalities

or significant changes.  There is no evidence of a mass or adenopathy.  There is no

change in the osseous structures.



IMPRESSION:

There is no acute intraabdominal or intrapelvic disease.  Findings as described above.





<Electronically signed by Ashok Sawyer > 06/07/21 0512

## 2021-06-07 NOTE — REPVR
PROCEDURE INFORMATION: 

Exam: CT Head Without Contrast 

Exam date and time: 6/7/2021 3:08 PM 

Age: 55 years old 

Clinical indication: Injury or trauma; Fall; Blunt trauma (contusions or 

hematomas); Additional info: Head injury, dizziness 



TECHNIQUE: 

Imaging protocol: Computed tomography of the head without contrast. 

Radiation optimization: All CT scans at this facility use at least one of these 

dose optimization techniques: automated exposure control; mA and/or kV 

adjustment per patient size (includes targeted exams where dose is matched to 

clinical indication); or iterative reconstruction. 



COMPARISON: 

No relevant prior studies available. 



FINDINGS: 

Brain: Normal. No hemorrhage. Unremarkable white matter. No mass effect. 

Cerebral ventricles: No ventriculomegaly. 

Paranasal sinuses: Visualized sinuses are unremarkable. No fluid levels. 

Mastoid air cells: Visualized mastoid air cells are well aerated. 

Bones/joints: Unremarkable. No acute fracture. 

Soft tissues: Unremarkable. 



IMPRESSION: 

No CT evidence for acute intracranial abnormality. 



Electronically signed by: Aníbal Castle On 06/07/2021  15:38:51 PM

## 2021-06-07 NOTE — REP
INDICATION:

DISP FX OF BASE OF 5TH MC BONE, R HAND, 7THB.



COMPARISON:

No prior CTs



TECHNIQUE:

Helical technique using 2 mm increments and reconstructed in both sagittal and coronal

planes.



FINDINGS:

The previously identified fracture involving the proximal aspect of the 5th metacarpal

is seen with hazy margins and increased density surrounding the fracture fragments

consistent with callus formation from healing.  There are no additional fractures.



IMPRESSION:

Healing fracture of the base of the 5th metacarpal.





<Electronically signed by Ashok Sawyer > 06/07/21 [id number]

## 2021-06-07 NOTE — REPVR
PROCEDURE INFORMATION: 

Exam: CT Cervical Spine Without Contrast 

Exam date and time: 6/7/2021 3:08 PM 

Age: 55 years old 

Clinical indication: Injury or trauma; Fall; Blunt trauma; Additional info: 

Head injury, dizziness 



TECHNIQUE: 

Imaging protocol: Computed tomography images of the cervical spine without 

contrast. 

Radiation optimization: All CT scans at this facility use at least one of these 

dose optimization techniques: automated exposure control; mA and/or kV 

adjustment per patient size (includes targeted exams where dose is matched to 

clinical indication); or iterative reconstruction. 



COMPARISON: 

CR SPINE CERVICAL COMPLETE 11/25/2015 10:27 AM 



FINDINGS: 

Bones/joints: Vertebral body heights are intact. Mild reversal of the normal 

cervical lordotic curve could be secondary to muscle spasm or positioning. 

Alignment is otherwise maintained. There is mild productive change about the 

dens. No acute fracture is identified. 

Discs/Spinal canal/Neural foramina: There is multilevel spondylosis with 

variable osteophytic encroachment of several neural foramina. CT is not optimal 

for the evaluation of the discs, neural foramina or spinal canal or cord. There 

may be spinal stenosis at C5-C6. 



Lungs: The visualized lung apices are clear. 

Pleural spaces: No apical pneumothorax is identified. 

Soft tissues: The prevertebral soft tissues are not significantly swollen. 



IMPRESSION: 

1. No acute fracture or dislocation identified. 

2. Mild reversal of the normal cervical lordotic curve, could be secondary to 

muscle spasm or positioning. 

3. Spondylosis with potential spinal stenosis at C5-C6. 



Electronically signed by: Aníbal Castle On 06/07/2021  15:42:55 PM

## 2021-09-15 ENCOUNTER — HOSPITAL ENCOUNTER (OUTPATIENT)
Dept: HOSPITAL 53 - M PLALAB | Age: 56
End: 2021-09-15
Attending: NURSE PRACTITIONER
Payer: MEDICARE

## 2021-09-15 DIAGNOSIS — E11.9: ICD-10-CM

## 2021-09-15 DIAGNOSIS — Z79.899: ICD-10-CM

## 2021-09-15 DIAGNOSIS — Z13.29: Primary | ICD-10-CM

## 2021-09-15 DIAGNOSIS — E55.9: ICD-10-CM

## 2021-09-15 DIAGNOSIS — Z98.84: ICD-10-CM

## 2021-09-15 LAB
25(OH)D3 SERPL-MCNC: 24.7 NG/ML (ref 30–100)
ALBUMIN SERPL BCG-MCNC: 4 GM/DL (ref 3.2–5.2)
ALT SERPL W P-5'-P-CCNC: 30 U/L (ref 12–78)
BILIRUB SERPL-MCNC: 0.4 MG/DL (ref 0.2–1)
BUN SERPL-MCNC: 12 MG/DL (ref 7–18)
CALCIUM SERPL-MCNC: 9.4 MG/DL (ref 8.5–10.1)
CHLORIDE SERPL-SCNC: 105 MEQ/L (ref 98–107)
CO2 SERPL-SCNC: 28 MEQ/L (ref 21–32)
CREAT SERPL-MCNC: 0.86 MG/DL (ref 0.55–1.3)
CREAT UR-MCNC: 220 MG/DL
EST. AVERAGE GLUCOSE BLD GHB EST-MCNC: 120 MG/DL (ref 60–110)
GFR SERPL CREATININE-BSD FRML MDRD: > 60 ML/MIN/{1.73_M2} (ref 51–?)
GLUCOSE SERPL-MCNC: 95 MG/DL (ref 70–100)
HCT VFR BLD AUTO: 43.9 % (ref 36–47)
HGB BLD-MCNC: 14.2 G/DL (ref 12–15.5)
IRON SERPL-MCNC: 71 UG/DL (ref 50–170)
MCH RBC QN AUTO: 28.2 PG (ref 27–33)
MCHC RBC AUTO-ENTMCNC: 32.3 G/DL (ref 32–36.5)
MCV RBC AUTO: 87.1 FL (ref 80–96)
MICROALBUMIN UR-MCNC: 23.4 MG/L
MICROALBUMIN/CREAT UR: 10.6 MCG/MG (ref 0–30)
PLATELET # BLD AUTO: 346 10^3/UL (ref 150–450)
POTASSIUM SERPL-SCNC: 4.9 MEQ/L (ref 3.5–5.1)
PROT SERPL-MCNC: 7.9 GM/DL (ref 6.4–8.2)
RBC # BLD AUTO: 5.04 10^6/UL (ref 4–5.4)
SODIUM SERPL-SCNC: 139 MEQ/L (ref 136–145)
TSH SERPL DL<=0.005 MIU/L-ACNC: 2.7 UIU/ML (ref 0.36–3.74)
WBC # BLD AUTO: 11.1 10^3/UL (ref 4–10)

## 2021-09-15 PROCEDURE — 85027 COMPLETE CBC AUTOMATED: CPT

## 2021-09-15 PROCEDURE — 84443 ASSAY THYROID STIM HORMONE: CPT

## 2021-09-15 PROCEDURE — 36415 COLL VENOUS BLD VENIPUNCTURE: CPT

## 2021-09-15 PROCEDURE — 80053 COMPREHEN METABOLIC PANEL: CPT

## 2021-09-15 PROCEDURE — 82306 VITAMIN D 25 HYDROXY: CPT

## 2021-09-15 PROCEDURE — 83540 ASSAY OF IRON: CPT

## 2021-09-15 PROCEDURE — 83036 HEMOGLOBIN GLYCOSYLATED A1C: CPT

## 2021-09-15 PROCEDURE — 82043 UR ALBUMIN QUANTITATIVE: CPT

## 2022-05-31 ENCOUNTER — HOSPITAL ENCOUNTER (OUTPATIENT)
Dept: HOSPITAL 53 - M PLALAB | Age: 57
End: 2022-05-31
Attending: NURSE PRACTITIONER
Payer: MEDICARE

## 2022-05-31 DIAGNOSIS — E11.9: ICD-10-CM

## 2022-05-31 DIAGNOSIS — R07.9: Primary | ICD-10-CM

## 2022-05-31 DIAGNOSIS — M79.7: ICD-10-CM

## 2022-05-31 DIAGNOSIS — K58.2: ICD-10-CM

## 2022-05-31 DIAGNOSIS — Z79.899: ICD-10-CM

## 2022-05-31 DIAGNOSIS — Z13.220: ICD-10-CM

## 2022-05-31 DIAGNOSIS — E55.9: ICD-10-CM

## 2022-05-31 DIAGNOSIS — Z13.29: ICD-10-CM

## 2022-05-31 LAB
25(OH)D3 SERPL-MCNC: 26.9 NG/ML (ref 30–100)
ALBUMIN SERPL BCG-MCNC: 3.8 GM/DL (ref 3.2–5.2)
ALT SERPL W P-5'-P-CCNC: 24 U/L (ref 12–78)
BILIRUB SERPL-MCNC: 0.4 MG/DL (ref 0.2–1)
BUN SERPL-MCNC: 14 MG/DL (ref 7–18)
CALCIUM SERPL-MCNC: 9.9 MG/DL (ref 8.5–10.1)
CHLORIDE SERPL-SCNC: 107 MEQ/L (ref 98–107)
CHOLEST SERPL-MCNC: 208 MG/DL (ref ?–200)
CHOLEST/HDLC SERPL: 4 {RATIO} (ref ?–5)
CK MB CFR.DF SERPL CALC: 1.69
CK MB SERPL-MCNC: < 1 NG/ML (ref ?–3.6)
CK SERPL-CCNC: 59 U/L (ref 26–192)
CO2 SERPL-SCNC: 29 MEQ/L (ref 21–32)
CREAT SERPL-MCNC: 0.78 MG/DL (ref 0.55–1.3)
EST. AVERAGE GLUCOSE BLD GHB EST-MCNC: 123 MG/DL (ref 60–110)
FERRITIN SERPL-MCNC: 23 NG/ML (ref 8–252)
GFR SERPL CREATININE-BSD FRML MDRD: > 60 ML/MIN/{1.73_M2} (ref 51–?)
GLUCOSE SERPL-MCNC: 108 MG/DL (ref 70–100)
HDLC SERPL-MCNC: 52 MG/DL (ref 40–?)
LDLC SERPL CALC-MCNC: 138 MG/DL (ref ?–100)
NONHDLC SERPL-MCNC: 156 MG/DL
POTASSIUM SERPL-SCNC: 4.2 MEQ/L (ref 3.5–5.1)
PROT SERPL-MCNC: 7.6 GM/DL (ref 6.4–8.2)
SODIUM SERPL-SCNC: 141 MEQ/L (ref 136–145)
TRIGL SERPL-MCNC: 90 MG/DL (ref ?–150)
TSH SERPL DL<=0.005 MIU/L-ACNC: 1.33 UIU/ML (ref 0.36–3.74)
VIT B12 SERPL-MCNC: 370 PG/ML (ref 247–911)

## 2022-06-01 ENCOUNTER — HOSPITAL ENCOUNTER (OUTPATIENT)
Dept: HOSPITAL 53 - M WHC | Age: 57
End: 2022-06-01
Attending: NURSE PRACTITIONER
Payer: MEDICARE

## 2022-06-01 DIAGNOSIS — Z80.3: ICD-10-CM

## 2022-06-01 DIAGNOSIS — Z12.31: Primary | ICD-10-CM

## 2022-11-10 ENCOUNTER — HOSPITAL ENCOUNTER (OUTPATIENT)
Dept: HOSPITAL 53 - M PLALAB | Age: 57
End: 2022-11-10
Attending: NURSE PRACTITIONER
Payer: MEDICARE

## 2022-11-10 DIAGNOSIS — Z79.899: ICD-10-CM

## 2022-11-10 DIAGNOSIS — E55.9: ICD-10-CM

## 2022-11-10 DIAGNOSIS — R68.89: ICD-10-CM

## 2022-11-10 DIAGNOSIS — E11.9: Primary | ICD-10-CM

## 2022-11-10 LAB
25(OH)D3 SERPL-MCNC: 46.7 NG/ML (ref 30–100)
ALBUMIN SERPL BCG-MCNC: 4.1 GM/DL (ref 3.2–5.2)
ALT SERPL W P-5'-P-CCNC: 26 U/L (ref 12–78)
BILIRUB SERPL-MCNC: 0.3 MG/DL (ref 0.2–1)
BUN SERPL-MCNC: 14 MG/DL (ref 7–18)
CALCIUM SERPL-MCNC: 10.2 MG/DL (ref 8.5–10.1)
CHLORIDE SERPL-SCNC: 104 MEQ/L (ref 98–107)
CO2 SERPL-SCNC: 29 MEQ/L (ref 21–32)
CREAT SERPL-MCNC: 0.92 MG/DL (ref 0.55–1.3)
EST. AVERAGE GLUCOSE BLD GHB EST-MCNC: 128 MG/DL (ref 60–110)
GFR SERPL CREATININE-BSD FRML MDRD: > 60 ML/MIN/{1.73_M2} (ref 51–?)
GLUCOSE SERPL-MCNC: 62 MG/DL (ref 70–100)
POTASSIUM SERPL-SCNC: 4.9 MEQ/L (ref 3.5–5.1)
PROT SERPL-MCNC: 8.3 GM/DL (ref 6.4–8.2)
SODIUM SERPL-SCNC: 138 MEQ/L (ref 136–145)
VIT B12 SERPL-MCNC: 581 PG/ML (ref 247–911)

## 2023-07-05 ENCOUNTER — HOSPITAL ENCOUNTER (OUTPATIENT)
Dept: HOSPITAL 53 - M PLAIMG | Age: 58
End: 2023-07-05
Attending: NURSE PRACTITIONER
Payer: MEDICARE

## 2023-07-05 DIAGNOSIS — R68.89: Primary | ICD-10-CM

## 2023-12-19 ENCOUNTER — HOSPITAL ENCOUNTER (OUTPATIENT)
Dept: HOSPITAL 53 - M PLALAB | Age: 58
End: 2023-12-19
Attending: NURSE PRACTITIONER
Payer: MEDICARE

## 2023-12-19 DIAGNOSIS — Z13.29: ICD-10-CM

## 2023-12-19 DIAGNOSIS — R68.89: ICD-10-CM

## 2023-12-19 DIAGNOSIS — Z13.220: ICD-10-CM

## 2023-12-19 DIAGNOSIS — E11.9: Primary | ICD-10-CM

## 2023-12-19 LAB
ALBUMIN SERPL BCG-MCNC: 3.8 G/DL (ref 3.2–5.2)
ALP SERPL-CCNC: 108 U/L (ref 46–116)
ALT SERPL W P-5'-P-CCNC: 21 U/L (ref 7–40)
AST SERPL-CCNC: 20 U/L (ref ?–34)
BILIRUB SERPL-MCNC: 0.5 MG/DL (ref 0.3–1.2)
BUN SERPL-MCNC: 10 MG/DL (ref 9–23)
CALCIUM SERPL-MCNC: 9.7 MG/DL (ref 8.5–10.1)
CHLORIDE SERPL-SCNC: 106 MMOL/L (ref 98–107)
CHOLEST SERPL-MCNC: 203 MG/DL (ref ?–200)
CHOLEST/HDLC SERPL: 3.88 {RATIO} (ref ?–5)
CO2 SERPL-SCNC: 29 MMOL/L (ref 20–31)
CREAT SERPL-MCNC: 0.8 MG/DL (ref 0.55–1.3)
CREAT UR-MCNC: 184.1 MG/DL
EST. AVERAGE GLUCOSE BLD GHB EST-MCNC: 120 MG/DL (ref 60–110)
GFR SERPL CREATININE-BSD FRML MDRD: > 60 ML/MIN/{1.73_M2} (ref 51–?)
GLUCOSE SERPL-MCNC: 110 MG/DL (ref 60–100)
HDLC SERPL-MCNC: 52.2 MG/DL (ref 40–?)
LDLC SERPL CALC-MCNC: 131.8 MG/DL (ref ?–100)
MICROALBUMIN UR-MCNC: 5 MG/L
MICROALBUMIN/CREAT UR: 2.7 MCG/MG (ref 0–30)
NONHDLC SERPL-MCNC: 150.8 MG/DL
POTASSIUM SERPL-SCNC: 4.4 MMOL/L (ref 3.5–5.1)
PROT SERPL-MCNC: 7.1 G/DL (ref 5.7–8.2)
SODIUM SERPL-SCNC: 140 MMOL/L (ref 136–145)
TRIGL SERPL-MCNC: 95 MG/DL (ref ?–150)
TSH SERPL DL<=0.005 MIU/L-ACNC: 4.24 UIU/ML (ref 0.55–4.78)
VIT B12 SERPL-MCNC: 365 PG/ML (ref 211–911)

## 2024-03-28 ENCOUNTER — HOSPITAL ENCOUNTER (OUTPATIENT)
Dept: HOSPITAL 53 - M PLAIMG | Age: 59
End: 2024-03-28
Attending: NURSE PRACTITIONER
Payer: MEDICARE

## 2024-03-28 ENCOUNTER — HOSPITAL ENCOUNTER (OUTPATIENT)
Dept: HOSPITAL 53 - M WHC | Age: 59
End: 2024-03-28
Attending: NURSE PRACTITIONER
Payer: MEDICARE

## 2024-03-28 DIAGNOSIS — M25.561: ICD-10-CM

## 2024-03-28 DIAGNOSIS — Z12.31: Primary | ICD-10-CM

## 2024-03-28 DIAGNOSIS — M25.561: Primary | ICD-10-CM

## 2024-07-16 ENCOUNTER — HOSPITAL ENCOUNTER (OUTPATIENT)
Dept: HOSPITAL 53 - M PLALAB | Age: 59
End: 2024-07-16
Attending: NURSE PRACTITIONER
Payer: MEDICARE

## 2024-07-16 DIAGNOSIS — E11.9: ICD-10-CM

## 2024-07-16 DIAGNOSIS — Z13.220: ICD-10-CM

## 2024-07-16 DIAGNOSIS — Z13.29: ICD-10-CM

## 2024-07-16 DIAGNOSIS — E55.9: Primary | ICD-10-CM

## 2024-07-16 LAB
25(OH)D3 SERPL-MCNC: 44.2 NG/ML (ref 20–100)
ALBUMIN SERPL BCG-MCNC: 3.9 G/DL (ref 3.2–5.2)
ALP SERPL-CCNC: 126 U/L (ref 46–116)
ALT SERPL W P-5'-P-CCNC: 22 U/L (ref 7–40)
AST SERPL-CCNC: 17 U/L (ref ?–34)
BILIRUB SERPL-MCNC: 0.6 MG/DL (ref 0.3–1.2)
BUN SERPL-MCNC: 10 MG/DL (ref 9–23)
CALCIUM SERPL-MCNC: 9.9 MG/DL (ref 8.5–10.1)
CHLORIDE SERPL-SCNC: 106 MMOL/L (ref 98–107)
CHOLEST SERPL-MCNC: 185 MG/DL (ref ?–200)
CHOLEST/HDLC SERPL: 3.69 {RATIO} (ref ?–5)
CO2 SERPL-SCNC: 29 MMOL/L (ref 20–31)
CREAT SERPL-MCNC: 0.79 MG/DL (ref 0.55–1.3)
EST. AVERAGE GLUCOSE BLD GHB EST-MCNC: 120 MG/DL (ref 60–110)
GFR SERPL CREATININE-BSD FRML MDRD: > 60 ML/MIN/{1.73_M2} (ref 51–?)
GLUCOSE SERPL-MCNC: 110 MG/DL (ref 60–100)
HCT VFR BLD AUTO: 43.4 % (ref 36–47)
HDLC SERPL-MCNC: 50.1 MG/DL (ref 40–?)
HGB BLD-MCNC: 14.1 G/DL (ref 12–15.5)
LDLC SERPL CALC-MCNC: 115.7 MG/DL (ref ?–100)
MCH RBC QN AUTO: 28.4 PG (ref 27–33)
MCHC RBC AUTO-ENTMCNC: 32.5 G/DL (ref 32–36.5)
MCV RBC AUTO: 87.3 FL (ref 80–96)
NONHDLC SERPL-MCNC: 134.9 MG/DL
PLATELET # BLD AUTO: 284 10^3/UL (ref 150–450)
POTASSIUM SERPL-SCNC: 5 MMOL/L (ref 3.5–5.1)
PROT SERPL-MCNC: 7.1 G/DL (ref 5.7–8.2)
RBC # BLD AUTO: 4.97 10^6/UL (ref 4–5.4)
SODIUM SERPL-SCNC: 140 MMOL/L (ref 136–145)
T4 FREE SERPL-MCNC: 1.09 NG/DL (ref 0.89–1.76)
TRIGL SERPL-MCNC: 96 MG/DL (ref ?–150)
TSH SERPL DL<=0.005 MIU/L-ACNC: 2.02 UIU/ML (ref 0.55–4.78)
WBC # BLD AUTO: 8 10^3/UL (ref 4–10)